# Patient Record
Sex: MALE | Race: BLACK OR AFRICAN AMERICAN | HISPANIC OR LATINO | URBAN - METROPOLITAN AREA
[De-identification: names, ages, dates, MRNs, and addresses within clinical notes are randomized per-mention and may not be internally consistent; named-entity substitution may affect disease eponyms.]

---

## 2018-11-14 ENCOUNTER — HOSPITAL ENCOUNTER (EMERGENCY)
Facility: OTHER | Age: 28
Discharge: HOME | End: 2018-11-14
Attending: EMERGENCY MEDICINE

## 2018-11-14 VITALS
TEMPERATURE: 98 F | HEART RATE: 70 BPM | RESPIRATION RATE: 20 BRPM | SYSTOLIC BLOOD PRESSURE: 146 MMHG | DIASTOLIC BLOOD PRESSURE: 80 MMHG | OXYGEN SATURATION: 100 %

## 2018-11-14 DIAGNOSIS — R10.9 FLANK PAIN: Primary | ICD-10-CM

## 2018-11-14 DIAGNOSIS — R30.0 DYSURIA: ICD-10-CM

## 2018-11-14 LAB
ALBUMIN SERPL BCP-MCNC: 3.7 G/DL
ALP SERPL-CCNC: 78 U/L
ALT SERPL W/O P-5'-P-CCNC: 14 U/L
ANION GAP SERPL CALC-SCNC: 10 MMOL/L
AST SERPL-CCNC: 25 U/L
BACTERIA #/AREA URNS HPF: NORMAL /HPF
BASOPHILS # BLD AUTO: 0.05 K/UL
BASOPHILS NFR BLD: 0.5 %
BILIRUB SERPL-MCNC: 0.3 MG/DL
BILIRUB UR QL STRIP: NEGATIVE
BUN SERPL-MCNC: 19 MG/DL
CALCIUM SERPL-MCNC: 9.5 MG/DL
CHLORIDE SERPL-SCNC: 106 MMOL/L
CK SERPL-CCNC: 337 U/L
CLARITY UR: CLEAR
CO2 SERPL-SCNC: 24 MMOL/L
COLOR UR: YELLOW
CREAT SERPL-MCNC: 1.2 MG/DL
DIFFERENTIAL METHOD: ABNORMAL
EOSINOPHIL # BLD AUTO: 0.6 K/UL
EOSINOPHIL NFR BLD: 6.5 %
ERYTHROCYTE [DISTWIDTH] IN BLOOD BY AUTOMATED COUNT: 13.2 %
EST. GFR  (AFRICAN AMERICAN): >60 ML/MIN/1.73 M^2
EST. GFR  (NON AFRICAN AMERICAN): >60 ML/MIN/1.73 M^2
GLUCOSE SERPL-MCNC: 91 MG/DL
GLUCOSE UR QL STRIP: NEGATIVE
HCT VFR BLD AUTO: 42 %
HGB BLD-MCNC: 14.2 G/DL
HGB UR QL STRIP: ABNORMAL
KETONES UR QL STRIP: NEGATIVE
LEUKOCYTE ESTERASE UR QL STRIP: NEGATIVE
LYMPHOCYTES # BLD AUTO: 1.6 K/UL
LYMPHOCYTES NFR BLD: 16.4 %
MCH RBC QN AUTO: 33 PG
MCHC RBC AUTO-ENTMCNC: 33.8 G/DL
MCV RBC AUTO: 98 FL
MICROSCOPIC COMMENT: NORMAL
MONOCYTES # BLD AUTO: 1.2 K/UL
MONOCYTES NFR BLD: 11.9 %
NEUTROPHILS # BLD AUTO: 6.3 K/UL
NEUTROPHILS NFR BLD: 64.6 %
NITRITE UR QL STRIP: NEGATIVE
PH UR STRIP: 7 [PH] (ref 5–8)
PLATELET # BLD AUTO: 114 K/UL
PMV BLD AUTO: 12.7 FL
POTASSIUM SERPL-SCNC: 3.6 MMOL/L
PROT SERPL-MCNC: 7.3 G/DL
PROT UR QL STRIP: NEGATIVE
RBC # BLD AUTO: 4.3 M/UL
RBC #/AREA URNS HPF: 3 /HPF (ref 0–4)
SODIUM SERPL-SCNC: 140 MMOL/L
SP GR UR STRIP: <=1.005 (ref 1–1.03)
SQUAMOUS #/AREA URNS HPF: 1 /HPF
URN SPEC COLLECT METH UR: ABNORMAL
UROBILINOGEN UR STRIP-ACNC: NEGATIVE EU/DL
WBC # BLD AUTO: 9.68 K/UL
WBC #/AREA URNS HPF: 1 /HPF (ref 0–5)

## 2018-11-14 PROCEDURE — 99284 EMERGENCY DEPT VISIT MOD MDM: CPT | Mod: 25

## 2018-11-14 PROCEDURE — 81000 URINALYSIS NONAUTO W/SCOPE: CPT

## 2018-11-14 PROCEDURE — 82550 ASSAY OF CK (CPK): CPT

## 2018-11-14 PROCEDURE — 25000003 PHARM REV CODE 250: Performed by: EMERGENCY MEDICINE

## 2018-11-14 PROCEDURE — 80053 COMPREHEN METABOLIC PANEL: CPT

## 2018-11-14 PROCEDURE — 96361 HYDRATE IV INFUSION ADD-ON: CPT

## 2018-11-14 PROCEDURE — 96374 THER/PROPH/DIAG INJ IV PUSH: CPT

## 2018-11-14 PROCEDURE — 63600175 PHARM REV CODE 636 W HCPCS: Performed by: EMERGENCY MEDICINE

## 2018-11-14 PROCEDURE — 87086 URINE CULTURE/COLONY COUNT: CPT

## 2018-11-14 PROCEDURE — 96375 TX/PRO/DX INJ NEW DRUG ADDON: CPT

## 2018-11-14 PROCEDURE — 87491 CHLMYD TRACH DNA AMP PROBE: CPT

## 2018-11-14 PROCEDURE — 85025 COMPLETE CBC W/AUTO DIFF WBC: CPT

## 2018-11-14 RX ORDER — AZITHROMYCIN 250 MG/1
1000 TABLET, FILM COATED ORAL
Status: COMPLETED | OUTPATIENT
Start: 2018-11-14 | End: 2018-11-14

## 2018-11-14 RX ORDER — CEPHALEXIN 500 MG/1
500 CAPSULE ORAL EVERY 12 HOURS
Qty: 14 CAPSULE | Refills: 0 | Status: SHIPPED | OUTPATIENT
Start: 2018-11-14 | End: 2018-11-21

## 2018-11-14 RX ORDER — CEFTRIAXONE 250 MG/1
250 INJECTION, POWDER, FOR SOLUTION INTRAMUSCULAR; INTRAVENOUS
Status: COMPLETED | OUTPATIENT
Start: 2018-11-14 | End: 2018-11-14

## 2018-11-14 RX ORDER — KETOROLAC TROMETHAMINE 30 MG/ML
15 INJECTION, SOLUTION INTRAMUSCULAR; INTRAVENOUS
Status: COMPLETED | OUTPATIENT
Start: 2018-11-14 | End: 2018-11-14

## 2018-11-14 RX ADMIN — CEFTRIAXONE SODIUM 250 MG: 250 INJECTION, POWDER, FOR SOLUTION INTRAMUSCULAR; INTRAVENOUS at 05:11

## 2018-11-14 RX ADMIN — AZITHROMYCIN 1000 MG: 250 TABLET, FILM COATED ORAL at 05:11

## 2018-11-14 RX ADMIN — KETOROLAC TROMETHAMINE 15 MG: 30 INJECTION, SOLUTION INTRAMUSCULAR at 05:11

## 2018-11-14 RX ADMIN — SODIUM CHLORIDE 1000 ML: 0.9 INJECTION, SOLUTION INTRAVENOUS at 03:11

## 2018-11-14 NOTE — ED NOTES
The patient's cell phone number was updated in the chart for contact regarding results of the pending gonorrhea urine test and urine culture:  [phone]

## 2018-11-14 NOTE — ED NOTES
Pt ambulated to bathroom with steady gait, returned to room and reconnected to cardiac, continuous pulse ox and BP monitoring. IV bolus of NS reconnected to IV in left AC. MD at bedside with [facility] jacobo translate for further evaluation of pt.

## 2018-11-14 NOTE — ED PROVIDER NOTES
Encounter Date: 11/14/2018    SCRIBE #1 NOTE: I, Domonique Rg, am scribing for, and in the presence of, Dr. Melgoza.       History     Chief Complaint   Patient presents with    Urinary Frequency     Pt reports right flank pain with dysuria and urinary frequency for the past week.      Time seen by provider: 2:54 PM    This is a 28 y.o. male who presents with complaint of constant right flank pain that began one month ago. He experiences flank pain with urination and back pain with exertion and standing. He reports hematuria and white penile discharge that began with onset of pain pain one month ago, though hematuria has resolved since onset. He reports frequent urination, no dysuria. He reports subjective fever, chills, nausea without vomiting, and dizziness. He had similar episode about 16 years ago and was diagnosed with kidney infection. He took the medication prescribed to him, but has not been seen since then. He also reports penile pain during sexual intercourse. He has had recent unprotected sex and is concerned for STD, though denies h/o gonorrhea or chlamydia. He denies testicular pain or swelling. He also reports episodic blood in stool, which he has had in the past; no associated rectal pain. He denies diarrhea or constipation.      The history is provided by the patient. The history is limited by a language barrier. A  was used.     Review of patient's allergies indicates:  No Known Allergies  History reviewed. No pertinent past medical history.  History reviewed. No pertinent surgical history.  History reviewed. No pertinent family history.  Social History     Tobacco Use    Smoking status: Current Every Day Smoker     Packs/day: 0.50     Types: Cigarettes    Smokeless tobacco: Never Used   Substance Use Topics    Alcohol use: Yes     Comment: Occasional    Drug use: Yes     Types: Marijuana     Review of Systems   Constitutional: Positive for chills and fever.   HENT:  Negative for sore throat.    Respiratory: Negative for shortness of breath.    Cardiovascular: Negative for chest pain.   Gastrointestinal: Positive for blood in stool and nausea. Negative for abdominal distention, abdominal pain, constipation, diarrhea, rectal pain and vomiting.   Genitourinary: Positive for discharge, flank pain, frequency, hematuria and penile pain. Negative for dysuria, penile swelling, scrotal swelling and testicular pain.   Musculoskeletal: Positive for back pain. Negative for myalgias.   Skin: Negative for rash.   Neurological: Positive for dizziness. Negative for weakness and headaches.   Hematological: Does not bruise/bleed easily.       Physical Exam     Initial Vitals [11/14/18 1348]   BP Pulse Resp Temp SpO2   138/72 64 18 98.3 °F (36.8 °C) 97 %      MAP       --         Physical Exam    Nursing note and vitals reviewed.  Constitutional: He appears well-developed and well-nourished. He is not diaphoretic. No distress.   HENT:   Head: Normocephalic and atraumatic.   Eyes: Conjunctivae and EOM are normal. No scleral icterus.   Neck: Normal range of motion. Neck supple.   Cardiovascular: Normal rate, regular rhythm and normal heart sounds. Exam reveals no gallop and no friction rub.    No murmur heard.  Pulmonary/Chest: Breath sounds normal. No respiratory distress. He has no wheezes. He has no rhonchi. He has no rales.   Abdominal: Soft. Bowel sounds are normal. He exhibits no distension. There is no tenderness. There is CVA tenderness (right). There is no rebound and no guarding.   Genitourinary: Rectal exam shows no external hemorrhoid. Right testis shows no tenderness. Left testis shows no tenderness. No discharge found.   Genitourinary Comments: No lesions. Scattered inguinal LAD.   Musculoskeletal: Normal range of motion. He exhibits no edema or tenderness.   Neurological: He is alert and oriented to person, place, and time.   Skin: Skin is warm and dry.         ED Course    Procedures  Labs Reviewed   URINALYSIS, REFLEX TO URINE CULTURE - Abnormal; Notable for the following components:       Result Value    Specific Gravity, UA <=1.005 (*)     Occult Blood UA 1+ (*)     All other components within normal limits    Narrative:     Preferred Collection Type->Urine, Clean Catch   CBC W/ AUTO DIFFERENTIAL - Abnormal; Notable for the following components:    RBC 4.30 (*)     MCH 33.0 (*)     Platelets 114 (*)     Mono # 1.2 (*)     Eos # 0.6 (*)     Lymph% 16.4 (*)     All other components within normal limits   CK - Abnormal; Notable for the following components:     (*)     All other components within normal limits   CULTURE, URINE   C. TRACHOMATIS/N. GONORRHOEAE BY AMP DNA   CULTURE, URINE   C. TRACHOMATIS/N. GONORRHOEAE BY AMP DNA   URINALYSIS MICROSCOPIC    Narrative:     Preferred Collection Type->Urine, Clean Catch   COMPREHENSIVE METABOLIC PANEL          Imaging Results          CT Renal Stone Study ABD Pelvis WO (Final result)  Result time 11/14/18 15:32:16    Final result by Paulino Hercules MD (11/14/18 15:32:16)                 Impression:      1. No convincing findings to suggest obstructive uropathy noting slight indistinctness of the right renal hilum is nonspecific.  Correlation with urinalysis is advised, differential would include sequela of recently passed calculus or infection.  2. Several additional findings above.      Electronically signed by: Paulino Hercules MD  Date:    11/14/2018  Time:    15:32             Narrative:    EXAMINATION:  CT RENAL STONE STUDY ABD PELVIS WO    CLINICAL HISTORY:  Flank pain, stone disease suspected;R side;    TECHNIQUE:  Low dose axial images, sagittal and coronal reformations were obtained from the lung bases to the pubic symphysis.  Contrast was not administered.    COMPARISON:  None    FINDINGS:  Images of the lower thorax are unremarkable.    The liver, spleen, pancreas, gallbladder and adrenal glands have a grossly  unremarkable noncontrast appearance allowing for patient motion.  There is a mild hiatal hernia.  There is no biliary dilation or ascites.  The pancreatic duct is not dilated.  The stomach is distended with ingested content.    The kidneys have a grossly unremarkable noncontrast appearance without hydronephrosis or nephrolithiasis, noting there is some indistinctness of the right renal hilum, possibly related to artifact..  The bilateral ureters are unable to be followed in their entirety to the urinary bladder, no definite calculi seen along their visualized and expected courses, no convincing secondary findings to suggest obstructive uropathy.  The urinary bladder is distended without wall thickening.  The prostate is not enlarged.    There is moderate stool in the colon.  The terminal ileum and appendix are grossly unremarkable.  No pericecal inflammation.  The small bowel is grossly unremarkable.  No focal organized pelvic fluid collection.    Degenerative changes are noted of the right sacroiliac joint.  No focal osseous destructive process.  No significant inguinal lymphadenopathy noting numerous bilateral inguinal lymph nodes.                                 Medical Decision Making:   Initial Assessment:       Previously healthy 28-year-old male presents with 1 month atraumatic right flank pain, associated with increased urinary frequency but no dysuria.  He had hematuria at onset but none since, and also with penile discharge and STD risk factors.  Pain is constant and worse with certain movements and worse with urination.  Distant history of similar symptoms as treated as kidney infection with improvement, no issues since then.  Mild right CVA tenderness on exam with no abdominal tenderness and no active penile discharge or testicular tenderness or  lesions.       ED workup with no elevated WBC, normal renal function, slightly elevated CPK but not consistent with rhabdomyolysis.  UA with 1+ blood and 3  RBC, and only 1 WBC and rare bacteria on micro.  History with constant pain is not consistent with renal colic, but also is not consistent with persistent UTI/pyelo since he does not have fever or progression of infection after 1 month.  CT renal done with no sign renal colic or other acute process, and only mentions slight right renal hilum indistinctness that could be passed stone or infection.  Symptoms more consistent with infection than stone, though penis discharge and dysuria with relatively normal UA is concerning for STD such as GC/chlamydia.  Will treat empirically for these pending cultures.  Patient also has poor follow-up and given his symptoms and CT findings will also cover for potential pyelo with Keflex.  We will call him with any positive culture results and he will return to the ED for any worsening pain or fevers or hematuria or other concerns.  He will establish PCP and follow up as needed.        Clinical Tests:   Lab Tests: Ordered and Reviewed  Radiological Study: Ordered and Reviewed            Scribe Attestation:   Scribe #1: I performed the above scribed service and the documentation accurately describes the services I performed. I attest to the accuracy of the note.    Attending Attestation:           Physician Attestation for Scribe:  Physician Attestation Statement for Scribe #1: I, Dr. Melgoza, reviewed documentation, as scribed by Domonique Rg in my presence, and it is both accurate and complete.                    Clinical Impression:     1. Flank pain    2. Dysuria                                 Jagjit Melgoza MD  11/14/18 2317

## 2018-11-14 NOTE — ED TRIAGE NOTES
Pt presents to ED with c/o right sided flank pain and frequent urination for 1 month. Pt also reports dizziness, chills and hot flashes. Pt states he had blood in urine at onset 1 month ago. Pt also states he had similar problem 10 years ago with blood in urine and pain. Denies N/V, discharge from penis. Pt reports eating breakfast this morning.

## 2018-11-15 LAB
BACTERIA UR CULT: NO GROWTH
C TRACH DNA SPEC QL NAA+PROBE: DETECTED
N GONORRHOEA DNA SPEC QL NAA+PROBE: NOT DETECTED

## 2018-11-19 ENCOUNTER — NURSE TRIAGE (OUTPATIENT)
Dept: ADMINISTRATIVE | Facility: CLINIC | Age: 28
End: 2018-11-19

## 2018-11-19 NOTE — TELEPHONE ENCOUNTER
Reason for Disposition   General information question, no triage required and triager able to answer question    Protocols used: ST INFORMATION ONLY CALL-A-AH    Per hospital  pt has questions regarding recent hospital discharge. Pt advised per discharge paperwork. Pt also requesting results from ED. Pt instructed to call medical records and phone number given. Pt verbalizes understanding.